# Patient Record
Sex: MALE | Race: WHITE | NOT HISPANIC OR LATINO | URBAN - METROPOLITAN AREA
[De-identification: names, ages, dates, MRNs, and addresses within clinical notes are randomized per-mention and may not be internally consistent; named-entity substitution may affect disease eponyms.]

---

## 2018-06-06 ENCOUNTER — AMBULATORY - HEALTHEAST (OUTPATIENT)
Dept: LAB | Facility: CLINIC | Age: 38
End: 2018-06-06

## 2018-06-06 DIAGNOSIS — Z51.81 ENCOUNTER FOR THERAPEUTIC DRUG MONITORING: ICD-10-CM

## 2018-06-06 DIAGNOSIS — F11.20 OPIOID TYPE DEPENDENCE, CONTINUOUS (H): ICD-10-CM

## 2018-06-06 LAB
AMPHETAMINES UR QL SCN: ABNORMAL
BARBITURATES UR QL: ABNORMAL
BENZODIAZ UR QL: ABNORMAL
BUPRENORPHINE QUAL URINE LHE: ABNORMAL
CANNABINOIDS UR QL SCN: ABNORMAL
COCAINE UR QL: ABNORMAL
CREAT UR-MCNC: 273.8 MG/DL
CREAT UR-MCNC: 286.7 MG/DL
METHADONE UR QL SCN: ABNORMAL
OPIATES UR QL SCN: ABNORMAL
OXYCODONE UR QL: ABNORMAL
PCP UR QL SCN: ABNORMAL

## 2018-09-24 ENCOUNTER — AMBULATORY - HEALTHEAST (OUTPATIENT)
Dept: LAB | Facility: CLINIC | Age: 38
End: 2018-09-24

## 2018-09-24 DIAGNOSIS — Z51.81 ENCOUNTER FOR THERAPEUTIC DRUG MONITORING: ICD-10-CM

## 2018-09-24 DIAGNOSIS — F11.20 OPIOID TYPE DEPENDENCE, CONTINUOUS (H): ICD-10-CM

## 2018-09-24 LAB
AMPHETAMINES UR QL SCN: NORMAL
BARBITURATES UR QL: NORMAL
BENZODIAZ UR QL: NORMAL
BUPRENORPHINE QUAL URINE LHE: ABNORMAL
CANNABINOIDS UR QL SCN: NORMAL
COCAINE UR QL: NORMAL
CREAT UR-MCNC: 161.6 MG/DL
CREAT UR-MCNC: 168.3 MG/DL
METHADONE UR QL SCN: NORMAL
OPIATES UR QL SCN: NORMAL
OXYCODONE UR QL: NORMAL
PCP UR QL SCN: NORMAL

## 2019-02-25 ENCOUNTER — AMBULATORY - HEALTHEAST (OUTPATIENT)
Dept: LAB | Facility: CLINIC | Age: 39
End: 2019-02-25

## 2019-02-25 DIAGNOSIS — Z51.81 ENCOUNTER FOR THERAPEUTIC DRUG MONITORING: ICD-10-CM

## 2019-02-25 DIAGNOSIS — F11.20 OPIOID TYPE DEPENDENCE, CONTINUOUS (H): ICD-10-CM

## 2019-02-25 LAB
AMPHETAMINES UR QL SCN: NORMAL
BARBITURATES UR QL: NORMAL
BENZODIAZ UR QL: NORMAL
BUPRENORPHINE QUAL URINE LHE: ABNORMAL
CANNABINOIDS UR QL SCN: NORMAL
COCAINE UR QL: NORMAL
CREAT UR-MCNC: 230.5 MG/DL
CREAT UR-MCNC: 240 MG/DL
METHADONE UR QL SCN: NORMAL
OPIATES UR QL SCN: NORMAL
OXYCODONE UR QL: NORMAL
PCP UR QL SCN: NORMAL

## 2019-08-01 ENCOUNTER — AMBULATORY - HEALTHEAST (OUTPATIENT)
Dept: LAB | Facility: CLINIC | Age: 39
End: 2019-08-01

## 2019-08-01 DIAGNOSIS — F11.20 OPIOID TYPE DEPENDENCE, CONTINUOUS (H): ICD-10-CM

## 2019-08-01 DIAGNOSIS — Z51.81 ENCOUNTER FOR THERAPEUTIC DRUG MONITORING: ICD-10-CM

## 2019-08-01 LAB
AMPHETAMINES UR QL SCN: NORMAL
BARBITURATES UR QL: NORMAL
BENZODIAZ UR QL: NORMAL
BUPRENORPHINE QUAL URINE LHE: ABNORMAL
CANNABINOIDS UR QL SCN: NORMAL
COCAINE UR QL: NORMAL
CREAT UR-MCNC: 181.4 MG/DL
CREAT UR-MCNC: 184.4 MG/DL
METHADONE UR QL SCN: NORMAL
OPIATES UR QL SCN: NORMAL
OXYCODONE UR QL: NORMAL
PCP UR QL SCN: NORMAL

## 2020-07-13 ENCOUNTER — AMBULATORY - HEALTHEAST (OUTPATIENT)
Dept: LAB | Facility: CLINIC | Age: 40
End: 2020-07-13

## 2020-07-13 DIAGNOSIS — F11.20 OPIOID TYPE DEPENDENCE, CONTINUOUS (H): ICD-10-CM

## 2020-07-13 DIAGNOSIS — Z51.81 ENCOUNTER FOR THERAPEUTIC DRUG MONITORING: ICD-10-CM

## 2020-07-13 LAB
AMPHETAMINES UR QL SCN: ABNORMAL
BARBITURATES UR QL: ABNORMAL
BENZODIAZ UR QL: ABNORMAL
BUPRENORPHINE QUAL URINE LHE: ABNORMAL
CANNABINOIDS UR QL SCN: ABNORMAL
COCAINE UR QL: ABNORMAL
CREAT UR-MCNC: 201.8 MG/DL
CREAT UR-MCNC: 208.2 MG/DL
METHADONE UR QL SCN: ABNORMAL
OPIATES UR QL SCN: ABNORMAL
OXYCODONE UR QL: ABNORMAL
PCP UR QL SCN: ABNORMAL

## 2020-11-03 ENCOUNTER — OFFICE VISIT - HEALTHEAST (OUTPATIENT)
Dept: ALLERGY | Facility: CLINIC | Age: 40
End: 2020-11-03

## 2020-11-03 ENCOUNTER — COMMUNICATION - HEALTHEAST (OUTPATIENT)
Dept: TELEHEALTH | Facility: CLINIC | Age: 40
End: 2020-11-03

## 2020-11-03 DIAGNOSIS — J45.40 MODERATE PERSISTENT ASTHMA WITHOUT COMPLICATION: ICD-10-CM

## 2020-11-03 DIAGNOSIS — J30.1 SEASONAL ALLERGIC RHINITIS DUE TO POLLEN: ICD-10-CM

## 2020-11-03 DIAGNOSIS — J30.81 ALLERGIC RHINITIS DUE TO ANIMALS: ICD-10-CM

## 2020-11-03 RX ORDER — DILTIAZEM HYDROCHLORIDE 60 MG/1
TABLET, FILM COATED ORAL
Status: SHIPPED | COMMUNITY
Start: 2020-10-10

## 2020-11-03 RX ORDER — BUPRENORPHINE AND NALOXONE 8; 2 MG/1; MG/1
FILM, SOLUBLE BUCCAL; SUBLINGUAL
Status: SHIPPED | COMMUNITY
Start: 2020-10-15

## 2020-11-03 RX ORDER — MONTELUKAST SODIUM 10 MG/1
TABLET ORAL
Status: SHIPPED | COMMUNITY
Start: 2020-07-22

## 2020-11-03 ASSESSMENT — MIFFLIN-ST. JEOR: SCORE: 2057.52

## 2021-05-28 ASSESSMENT — ASTHMA QUESTIONNAIRES: ACT_TOTALSCORE: 17

## 2021-06-04 VITALS — HEART RATE: 102 BPM | BODY MASS INDEX: 30.21 KG/M2 | HEIGHT: 74 IN | WEIGHT: 235.4 LBS | OXYGEN SATURATION: 96 %

## 2021-06-12 NOTE — PROGRESS NOTES
Chief complaint: Allergies    History of present illness: This is a pleasant 40-year-old gentleman with a history of asthma here today to discuss allergies.  He states that 3 years ago he was diagnosed with asthma.  Hospitalized at that time.  He currently follows a pulmonologist but states he continues to have difficulty breathing.  He is currently on Symbicort but is not sure of the dose.  He is taking 2 puffs twice daily.  He states he has been on prednisone with not exactly sure of the dates of when that stopped.  He does not take any allergy medication regularly.  No nasal congestion or drainage.  He does not use any nasal sprays.  He is on montelukast.  I do not have his records to review.    Past medical history: Otherwise unremarkable    Social history: He lives in a home that was built in [address], recently remove the carpeting, has central air, works as an , former smoker, he does have dogs and they have 2 rats at home.  The rats are kept in the living room.    Family history: Sister with allergies    Review of Systems performed as above and the remainder is negative.         Current Outpatient Medications:      albuterol (PROAIR HFA;PROVENTIL HFA;VENTOLIN HFA) 90 mcg/actuation inhaler, Inhale 2 puffs every 6 (six) hours as needed for wheezing., Disp: , Rfl:      albuterol (PROVENTIL) 2.5 mg /3 mL (0.083 %) nebulizer solution, Take 3 mL (2.5 mg total) by nebulization every 4 (four) hours as needed for wheezing or shortness of breath., Disp: 30 vial, Rfl: 0     fluticasone (FLOVENT HFA) 110 mcg/actuation inhaler, Inhale 1-2 puffs 2 (two) times a day., Disp: , Rfl:      phenylephrine-acetaminophen-GG (MUCINEX COLD AND SINUS) -400 mg/20 mL Liqd, Take 20 mL by mouth every 4 (four) hours as needed., Disp: , Rfl:      buprenorphine-naloxone (SUBOXONE SL FILM) 8-2 mg Film per sublingual film, TAKE 2 FILM SUBLINGUALLY PER DAY, Disp: , Rfl:      montelukast (SINGULAIR) 10 mg tablet, TK 1 T PO QPM,  "Disp: , Rfl:      predniSONE (DELTASONE) 20 MG tablet, Take 2 tablets (40 mg total) by mouth daily with breakfast. 40mg QD 2 days, 30mg QD 2 days, 20mg QD 2 days then stop, Disp: 9 tablet, Rfl: 0     PROCHAMBER Spcr, USE WITH ALBUTEROL INHALER, Disp: , Rfl:      SYMBICORT 80-4.5 mcg/actuation inhaler, INHALE 2 PUFF BY INHALATION ROUTE 2 TIMES QD IN THE MORNING AND EVENING, Disp: , Rfl:     No Known Allergies    Pulse (!) 102   Ht 6' 2\" (1.88 m)   Wt (!) 235 lb 6.4 oz (106.8 kg)   SpO2 96%   BMI 30.22 kg/m    Gen: Pleasant male not in acute distress  HEENT: Eyes no erythema of the bulbar or palpebral conjunctiva, no edema. Ears: TMs well visualized, no effusions. Nose: No congestion, mucosa normal. Mouth: Throat clear, no lip or tongue edema.   Cardiac: Regular rate and rhythm, no murmurs, rubs or gallops  Respiratory: Clear to auscultation bilaterally, no adventitious breath sounds  Lymph: No supraclavicular or cervical lymphadenopathy  Skin: No rashes or lesions  Psych: Alert and oriented times 3    Last Percutaneous Allergy Test Results  Trees  Bruce, White  1:20 H  (W/F in mm): 0/0 (11/03/20 1008)  Birch Mix 1:20 H (W/F in mm): 13/44 (11/03/20 1008)  [place], Common 1:20 H (W/F in mm): 3/F (11/03/20 1008)  Elm, American 1:20 H (W/F in mm): 0/0 (11/03/20 1008)  Palm, Shagbark 1:20 H (W/F in mm): 0/0 (11/03/20 1008)  Maple, Hard/Sugar 1:20 H (W/F in mm): 5/F (11/03/20 1008)  [place] Mix 1:20 H (W/F in mm): 4/F (11/03/20 1008)  [place], Red 1:20 H (W/F in mm): 3/F (11/03/20 1008)  [place], American 1:20 H (W/F in mm): 0/0 (11/03/20 1008)  [place] Tree 1:20 H (W/F in mm): 0/0 (11/03/20 1008)  Dust Mites  D. Pteronyssinus Mite 30,000 AU/ML H (W/F in mm): 0/0 (11/03/20 1008)  D. Farinae Mite 30,000 AU/ML H (W/F in mm: 0/0 (11/03/20 1008)  Grasses  Grass Mix #4 10,000 BAU/ML H: 0/0 (11/03/20 1008)  David Grass 1:20 H (W/F in mm): 0/0 (11/03/20 1008)  Cockroach  Cockroach Mix 1:10 H (W/F in mm): 0/0 (11/03/20 " 1008)  Molds/Fungi  Alternaria Tenuis 1:10 H (W/F in mm): 0/0 (11/03/20 1008)  Aspergillus Fumigatus 1:10 H (W/F in mm): 0/0 (11/03/20 1008)  Homodendrum Cladosporioides 1:10 H (W/F in mm): 0/0 (11/03/20 1008)  Penicillin Notatum 1:10 H (W/F in mm): 0/0 (11/03/20 1008)  Epicoccum 1:10 H (W/F in mm): 0/0 (11/03/20 1008)  Weeds  Ragweed, Short 1:20 H (W/F in mm): 0/0 (11/03/20 1008)  Dock, Sorrel 1:20 H (W/F in mm): 0/0 (11/03/20 1008)  Lamb's Quarter 1:20 H (W/F in mm): 0/0 (11/03/20 1008)  Pigweed, Rough Red Root 1:20 H  (W/F in mm): 0/0 (11/03/20 1008)  Plantain, English 1:20 H  (W/F in mm): 0/0 (11/03/20 1008)  Sagebrush, Mugwort 1:20 H  (W/F in mm): 0/0 (11/03/20 1008)  Animal  Cat 10,000 BAU/ML H (W/F in mm): 0/0 (11/03/20 1008)  AP Dog Hair/Dander 1:100: 0/0 (11/03/20 1008)  Other Animal  Mouse 1:20 G (W/F in mm): 13/40 (11/03/20 1008)  Controls  Device Type: QUINTIP (11/03/20 1008)  Neg. control: 50% Glycerine/Saline H (W/F in mm): 0/0 (11/03/20 1008)  Pos. control: Histamine 6mg/ML (W/F in mms): 5/15 (11/03/20 1008)    Impression report and plan:  1.  Allergic rhinitis  2.  Allergic asthma    I would like to remove the wraps from the home at least a month and see how he does.  I would like him to check his Symbicort dose and suggested that he is on a higher dose of Symbicort 160/4.5 2 puffs twice daily.  Recommend continuing with montelukast.  Could consider biologic agent if his symptoms continue such as Dupixent.  Follow-up as needed.

## 2021-06-12 NOTE — PATIENT INSTRUCTIONS - HE
Symbicort 160/4.6 2 puffs twice daily     Daily antihistamine    May want to move rats out of house for a period of time    Could consider a biologic agent--Dupixent?

## 2021-06-16 PROBLEM — J96.01 ACUTE RESPIRATORY FAILURE WITH HYPOXEMIA (H): Status: ACTIVE | Noted: 2017-10-03

## 2021-06-16 PROBLEM — J96.00 RESPIRATORY FAILURE, ACUTE (H): Status: ACTIVE | Noted: 2017-10-02

## 2021-12-06 ENCOUNTER — LAB (OUTPATIENT)
Dept: LAB | Facility: CLINIC | Age: 41
End: 2021-12-06
Payer: COMMERCIAL

## 2021-12-06 DIAGNOSIS — Z51.81 ENCOUNTER FOR THERAPEUTIC DRUG MONITORING: ICD-10-CM

## 2021-12-06 DIAGNOSIS — F11.20 OPIOID TYPE DEPENDENCE, CONTINUOUS (H): Primary | ICD-10-CM

## 2021-12-06 LAB
AMPHETAMINES UR QL SCN: ABNORMAL
BARBITURATES UR QL: ABNORMAL
BENZODIAZ UR QL: ABNORMAL
CANNABINOIDS UR QL SCN: ABNORMAL
COCAINE UR QL: ABNORMAL
CREAT UR-MCNC: 355 MG/DL
OPIATES UR QL SCN: ABNORMAL
OXYCODONE UR QL: ABNORMAL
PCP UR QL SCN: ABNORMAL

## 2021-12-06 PROCEDURE — 80307 DRUG TEST PRSMV CHEM ANLYZR: CPT

## 2021-12-07 LAB
BUPRENORPHINE QUAL URINE LHE: ABNORMAL
CREAT UR-MCNC: 346 MG/DL

## 2024-04-16 ENCOUNTER — LAB (OUTPATIENT)
Dept: LAB | Facility: CLINIC | Age: 44
End: 2024-04-16
Payer: COMMERCIAL

## 2024-04-16 DIAGNOSIS — F11.21 NARCOTIC DEPENDENCE, IN REMISSION (H): Primary | ICD-10-CM

## 2024-04-16 LAB
AMPHETAMINES UR QL SCN: ABNORMAL
BARBITURATES UR QL SCN: ABNORMAL
BENZODIAZ UR QL SCN: ABNORMAL
BZE UR QL SCN: ABNORMAL
CANNABINOIDS UR QL SCN: ABNORMAL
FENTANYL UR QL: ABNORMAL
OPIATES UR QL SCN: ABNORMAL
PCP QUAL URINE (ROCHE): ABNORMAL

## 2024-04-16 PROCEDURE — 80307 DRUG TEST PRSMV CHEM ANLYZR: CPT

## 2024-04-17 LAB
METHADONE UR QL SCN: NORMAL
OXYCODONE UR QL: NORMAL